# Patient Record
Sex: MALE | Race: WHITE | NOT HISPANIC OR LATINO
[De-identification: names, ages, dates, MRNs, and addresses within clinical notes are randomized per-mention and may not be internally consistent; named-entity substitution may affect disease eponyms.]

---
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
## 2022-02-15 ENCOUNTER — APPOINTMENT (OUTPATIENT)
Dept: ORTHOPEDIC SURGERY | Facility: CLINIC | Age: 22
End: 2022-02-15
Payer: MEDICAID

## 2022-02-15 PROCEDURE — 99214 OFFICE O/P EST MOD 30 MIN: CPT

## 2022-02-23 ENCOUNTER — OUTPATIENT (OUTPATIENT)
Dept: OUTPATIENT SERVICES | Facility: HOSPITAL | Age: 22
LOS: 1 days | End: 2022-02-23

## 2022-02-23 VITALS
RESPIRATION RATE: 16 BRPM | TEMPERATURE: 97 F | HEART RATE: 67 BPM | SYSTOLIC BLOOD PRESSURE: 100 MMHG | OXYGEN SATURATION: 99 % | HEIGHT: 71.5 IN | WEIGHT: 126.99 LBS | DIASTOLIC BLOOD PRESSURE: 70 MMHG

## 2022-02-23 DIAGNOSIS — M25.361 OTHER INSTABILITY, RIGHT KNEE: ICD-10-CM

## 2022-02-23 DIAGNOSIS — Z92.89 PERSONAL HISTORY OF OTHER MEDICAL TREATMENT: Chronic | ICD-10-CM

## 2022-02-23 LAB
HCT VFR BLD CALC: 43.7 % — SIGNIFICANT CHANGE UP (ref 39–50)
HGB BLD-MCNC: 15.3 G/DL — SIGNIFICANT CHANGE UP (ref 13–17)
MCHC RBC-ENTMCNC: 31 PG — SIGNIFICANT CHANGE UP (ref 27–34)
MCHC RBC-ENTMCNC: 35 GM/DL — SIGNIFICANT CHANGE UP (ref 32–36)
MCV RBC AUTO: 88.5 FL — SIGNIFICANT CHANGE UP (ref 80–100)
NRBC # BLD: 0 /100 WBCS — SIGNIFICANT CHANGE UP
NRBC # FLD: 0 K/UL — SIGNIFICANT CHANGE UP
PLATELET # BLD AUTO: 229 K/UL — SIGNIFICANT CHANGE UP (ref 150–400)
RBC # BLD: 4.94 M/UL — SIGNIFICANT CHANGE UP (ref 4.2–5.8)
RBC # FLD: 12.5 % — SIGNIFICANT CHANGE UP (ref 10.3–14.5)
WBC # BLD: 5 K/UL — SIGNIFICANT CHANGE UP (ref 3.8–10.5)
WBC # FLD AUTO: 5 K/UL — SIGNIFICANT CHANGE UP (ref 3.8–10.5)

## 2022-02-23 NOTE — H&P PST ADULT - HISTORY OF PRESENT ILLNESS
Patient instructed to contact surgeon's office concerning COVID test prior to surgery  Pt is a 21 yr old male scheduled for Right Knee Diagnostic Knee Arthroscopy Extra Articular Ligament Reconstruction Medial Patella Femoral Ligament with Dr Del Valle tentatively 3/2/22 - pt has prior injury to right knee x2 over past 3 yrs r/t instability and pain with use. Pt reinjured knee 12/21 and now for surgery - pt has full knee brace in place   Patient instructed to contact surgeon's office concerning COVID test prior to surgery

## 2022-02-23 NOTE — H&P PST ADULT - PROBLEM SELECTOR PLAN 1
Pt scheduled for surgery Right Knee Diagnostic Knee Arthroscopy Extra Articular Ligament Reconstruction Medial Patella Femoral Ligament with Dr Del Valle tentatively 3/2/22 and preop instructions including instructions for taking Famotidine and for Chlorhexidine use in showering on the day of surgery, given verbally and with use of  written materials, and patient confirming understanding of such instructions using  teach back method.

## 2022-02-23 NOTE — H&P PST ADULT - MUSCULOSKELETAL COMMENTS
Right knee pain without brace- pt c/o of instability of knee and pain with wt bearing - pt states right knee has mild swelling

## 2022-02-23 NOTE — H&P PST ADULT - NSANTHOSAYNRD_GEN_A_CORE
No. CORNELIA screening performed.  STOP BANG Legend: 0-2 = LOW Risk; 3-4 = INTERMEDIATE Risk; 5-8 = HIGH Risk

## 2022-02-25 NOTE — ADDENDUM
[FreeTextEntry1] : This note was written by Hannah Sim on 02/15/2022 acting solely as a scribe for Dr. Porfirio Del Valle.\par \par All medical record entries made by the Scribe were at my, Dr. Porfirio Del Valle, direction and personally dictated by me on 02/15/2022. I have personally reviewed the chart and agree that the record accurately reflects my personal performance of the history, physical exam, assessment and plan.

## 2022-02-25 NOTE — HISTORY OF PRESENT ILLNESS
[de-identified] : 21 year old male who presents today for follow up of right patellar instability. MRI knee dated 12/13/2021 shows evidence of recent patellar dislocation, TTTG measured 10 mm no significant chondral injury. He has been attending PT 2 x per week and reports improvement of ROM and current function. He presents in [place] brace at this time. He is here to discuss sx given multiple instability events. He is not takig pain medication.

## 2022-02-25 NOTE — DISCUSSION/SUMMARY
[de-identified] : 21-year-old male with right knee patellar instability\par \par Patient presents for follow-up of right knee patella instability.  Patient has been attending physical therapy with improvement of range of motion of function to the right knee.  He has some mild limitation in extension, but presents today for consideration of surgical stabilization of the patellofemoral joint.  \par \par Patient has malalignment including genu varum, patellar thomas, pes planus, with increase in TTTG seen on prior CT (~21mm).  No evidence of osteochondral injury on recent MRI, and MRI measures TTTG at 10mm.  Discussion was had with the patient and mother present regarding surgical stabilization of the right knee.  I believe that the patient would likely respond well to isolated medial patellofemoral ligament reconstruction despite mild elevation of TTTG on prior CT imaging.  Alternative would be for tibial tubercle osteotomy, but is my opinion that this is not required at this time.  We did discuss concern for future instability events even with medial patellofemoral ligament reconstruction.\par \par All risks, benefits and alternatives to the proposed surgical procedure, right knee arthroscopy and MPFL reconstruction, as well as the need for formal post-operative rehabilitation were discussed in great detail with the patient. Risks include but are not limited to pain, bleeding, infection, neurovascular injury, stiffness, patella instability, medical complications (including DVT, PE, MI), and risks of anesthesia. \par \par A discussion was also had with the patient regarding additional precautions during surgery given the recent Covid-19 pandemic; specifically with regards to perioperative care, visitor policy, and pre-admission testing. The patient understands that current protocol requires either documented Covid-19 vaccination or a negative Covid-19 test no more than 48hrs prior to surgery. \par \par Patient's and mother's questions and concerns were answered. They have elected to proceed and will schedule accordingly. For the time being, patient should continue PT for terminal extension.  \par \par

## 2022-02-25 NOTE — PHYSICAL EXAM
[de-identified] : Oriented to time, place, person\par Mood: Normal\par Affect: Normal\par Appearance: Healthy, well appearing, no acute distress.\par Gait: antalgic \par Assistive Devices: deepa brace\par \par Right knee exam\par \par Skin: Clean, dry, intact\par Inspection: No obvious malalignment, no masses, minimal effusion, negative J. sign, pes planus. Mild genu varum\par Pulses: 2+ DP/PT pulses\par ROM: 5-130 degrees of flexion. mild pain with knee flexion/extension.\par Tenderness: No MJLT. No LJLT. mild tenderness over the patella facets. No pain to the quadriceps tendon. No pain to the patella tendon. No posterior knee tenderness.\par Stability: Stable\par Strength: Intact Q/H/TA/GS/EHL, moderate quad atrophy\par Neuro: In tact to light touch throughout [de-identified] : Images were reviewed from ER dated 12.2.2021.\par \par 3 views of the right knee were obtained that show no acute fracture or dislocation. There is no medial, no lateral and no patellofemoral degenerative changes seen. There is no significant malalignment.  Possible small sliver avulsion fracture/osteochondral loose body.  Lipohemarthrosis.\par \par MRI knee dated 12/13/2021 shows evidence of recent patellar dislocation without evidence of osteochondral fragment. TTTG measured 10 mm. Large hemarthrosis. No significant chondral injury.

## 2022-03-01 ENCOUNTER — TRANSCRIPTION ENCOUNTER (OUTPATIENT)
Age: 22
End: 2022-03-01

## 2022-03-01 RX ORDER — OXYCODONE AND ACETAMINOPHEN 5; 325 MG/1; MG/1
5-325 TABLET ORAL
Qty: 30 | Refills: 0 | Status: ACTIVE | COMMUNITY
Start: 2022-03-01 | End: 1900-01-01

## 2022-03-02 ENCOUNTER — OUTPATIENT (OUTPATIENT)
Dept: OUTPATIENT SERVICES | Facility: HOSPITAL | Age: 22
LOS: 1 days | Discharge: HOME | End: 2022-03-02
Payer: MEDICAID

## 2022-03-02 ENCOUNTER — APPOINTMENT (OUTPATIENT)
Dept: ORTHOPEDIC SURGERY | Facility: AMBULATORY SURGERY CENTER | Age: 22
End: 2022-03-02

## 2022-03-02 VITALS
HEART RATE: 81 BPM | RESPIRATION RATE: 12 BRPM | SYSTOLIC BLOOD PRESSURE: 121 MMHG | DIASTOLIC BLOOD PRESSURE: 57 MMHG | OXYGEN SATURATION: 96 %

## 2022-03-02 VITALS
SYSTOLIC BLOOD PRESSURE: 121 MMHG | HEIGHT: 71.5 IN | WEIGHT: 126.99 LBS | OXYGEN SATURATION: 100 % | TEMPERATURE: 97 F | HEART RATE: 71 BPM | DIASTOLIC BLOOD PRESSURE: 68 MMHG | RESPIRATION RATE: 16 BRPM

## 2022-03-02 DIAGNOSIS — M25.361 OTHER INSTABILITY, RIGHT KNEE: ICD-10-CM

## 2022-03-02 DIAGNOSIS — Z92.89 PERSONAL HISTORY OF OTHER MEDICAL TREATMENT: Chronic | ICD-10-CM

## 2022-03-02 PROCEDURE — 27427 RECONSTRUCTION KNEE: CPT | Mod: AS,LT

## 2022-03-02 PROCEDURE — 27427 RECONSTRUCTION KNEE: CPT | Mod: LT

## 2022-03-02 DEVICE — IMP SYS MPLF FAST THREAD: Type: IMPLANTABLE DEVICE | Site: RIGHT | Status: FUNCTIONAL

## 2022-03-02 DEVICE — IMPLANTABLE DEVICE: Type: IMPLANTABLE DEVICE | Site: RIGHT | Status: FUNCTIONAL

## 2022-03-02 RX ORDER — HYDROXYZINE HCL 10 MG
2 TABLET ORAL
Qty: 0 | Refills: 0 | DISCHARGE

## 2022-03-02 RX ORDER — SODIUM CHLORIDE 9 MG/ML
1000 INJECTION, SOLUTION INTRAVENOUS
Refills: 0 | Status: DISCONTINUED | OUTPATIENT
Start: 2022-03-02 | End: 2022-03-16

## 2022-03-02 NOTE — ASU DISCHARGE PLAN (ADULT/PEDIATRIC) - CALL YOUR DOCTOR IF YOU HAVE ANY OF THE FOLLOWING:
Bleeding that does not stop/Fever greater than (need to indicate Fahrenheit or Celsius) Bleeding that does not stop/Swelling that gets worse/Pain not relieved by Medications/Fever greater than (need to indicate Fahrenheit or Celsius)/Wound/Surgical Site with redness, or foul smelling discharge or pus/Numbness, tingling, color or temperature change to extremity/Nausea and vomiting that does not stop/Inability to tolerate liquids or foods

## 2022-03-02 NOTE — ASU DISCHARGE PLAN (ADULT/PEDIATRIC) - NS MD DC FALL RISK RISK
For information on Fall & Injury Prevention, visit: [email or web address] OR  [email or web address] OR  https://www.cdc.gov/steadi/patient.html

## 2022-03-02 NOTE — ASU DISCHARGE PLAN (ADULT/PEDIATRIC) - PROCEDURE
Right knee arthroscopy extra articular ligament reconstruction medial patella femoral ligament reconstruction

## 2022-03-02 NOTE — ASU DISCHARGE PLAN (ADULT/PEDIATRIC) - ACTIVITY LEVEL
Weight bearing as tolerated weight bearing toe touch with crutches/No excercise/No heavy lifting/No sports/gym/Weight bearing as tolerated/Elevate extremity

## 2022-03-02 NOTE — ASU DISCHARGE PLAN (ADULT/PEDIATRIC) - CARE PROVIDER_API CALL
Porfirio Del Valle)  Orthopedics  [address]  [place] [address]  Phone: [phone]  Fax: [phone]  Follow Up Time:

## 2022-03-02 NOTE — ASU PREOPERATIVE ASSESSMENT, ADULT ([facility] ONLY) - FALL HARM RISK - RISK INTERVENTIONS

## 2022-03-02 NOTE — BRIEF OPERATIVE NOTE - NSICDXBRIEFPROCEDURE_GEN_ALL_CORE_FT
PROCEDURES:  Repair or release, ligament, medial patellofemoral, with patellar realignment 02-Mar-2022 14:50:43  Archie Caceres

## 2022-03-02 NOTE — ASU DISCHARGE PLAN (ADULT/PEDIATRIC) - PAIN MANAGEMENT
Prescriptions electronically submitted to pharmacy from [facility] oxycodone as needed for pain next dose 5pm, tylenol 1000mg every 6 hours can take next dose 645pm./Prescriptions electronically submitted to pharmacy from [facility]

## 2022-03-10 ENCOUNTER — APPOINTMENT (OUTPATIENT)
Dept: ORTHOPEDIC SURGERY | Facility: CLINIC | Age: 22
End: 2022-03-10
Payer: MEDICAID

## 2022-03-10 PROBLEM — J45.909 UNSPECIFIED ASTHMA, UNCOMPLICATED: Chronic | Status: ACTIVE | Noted: 2022-02-23

## 2022-03-10 PROCEDURE — 99024 POSTOP FOLLOW-UP VISIT: CPT

## 2022-03-11 NOTE — ADDENDUM
[FreeTextEntry1] : This note was written by Hannah Sim on 03/10/2022 acting solely as a scribe for Dr. Porfirio Del Valle.\par \par All medical record entries made by the Scribe were at my, Dr. Porfirio Del Valle, direction and personally dictated by me on 03/10/2022. I have personally reviewed the chart and agree that the record accurately reflects my personal performance of the history, physical exam, assessment and plan.

## 2022-03-11 NOTE — HISTORY OF PRESENT ILLNESS
[8] : the patient reports pain that is 8/10 in severity [Clean/Dry/Intact] : clean, dry and intact [Healed] : healed [Neuro Intact] : an unremarkable neurological exam [Vascular Intact] : ~T peripheral vascular exam normal [Doing Well] : is doing well [Excellent Pain Control] : has excellent pain control [No Sign of Infection] : is showing no signs of infection [Sutures Removed] : sutures were removed [Steri-Strips Removed & Replaced] : steri-strips removed and replaced [Chills] : no chills [Fever] : no fever [Nausea] : no nausea [Vomiting] : no vomiting [Erythema] : not erythematous [Discharge] : absent of discharge [Swelling] : not swollen [Dehiscence] : not dehisced [de-identified] : 22 y/o male s/p right knee MPFL reconstruction 3/2/22 [de-identified] : 20 y/o male s/p right knee MPFL reconstruction. He is doing well. Taking Ibuprofen for pain. Denies post op complications. Per mother, he is having difficulty getting around.  [de-identified] : Right Knee Exam:\par \par Skin: Incision(s) Clean, dry, intact, no drainage, healed\par Inspection: Residual swelling, moderate residual effusion, ecchymosis\par Pulses: 2+ DP/PT pulses \par ROM: Not tested [left/right]\par Tenderness: Tender throughout anterior knee joint.\par Stability: Stable\par Strength: Intact Q/H/TA/GS/EHL\par Neuro: Intact to light touch throughout  [de-identified] : 22 y/o male s/p right knee MPFL reconstruction. \par \par All intraoperative imaging was discussed in detail with the patient. All questions were answered. \par \par Recommendations: \par 1. PT evaluation and treatment; including AAROM/AROM, therapeutic exercise (include hamstring and quadriceps strengthening), heel slides, nonweightbearing stretch of the gastrocsoleus complex and straight leg raises to prevent extension lag. \par 2. Brace: Unlock brace for ambulation as tolerated. Remove the brace for sleeping as tolerated. May discontinue brace once full extension and without extensor lag has been achieved (approx 6wks post-op)\par 3. Meds: Continue [id number] daily, pain medication prn, may transition to NSAIDS.\par 4. Ice/elevate as needed and\par 5. Restrictions: None \par \par Followup in 4 weeks.

## 2022-04-05 ENCOUNTER — APPOINTMENT (OUTPATIENT)
Dept: ORTHOPEDIC SURGERY | Facility: CLINIC | Age: 22
End: 2022-04-05
Payer: MEDICAID

## 2022-04-05 PROCEDURE — 99024 POSTOP FOLLOW-UP VISIT: CPT

## 2022-04-14 NOTE — HISTORY OF PRESENT ILLNESS
[8] : the patient reports pain that is 8/10 in severity [Clean/Dry/Intact] : clean, dry and intact [Healed] : healed [Neuro Intact] : an unremarkable neurological exam [Vascular Intact] : ~T peripheral vascular exam normal [Excellent Pain Control] : has excellent pain control [No Sign of Infection] : is showing no signs of infection [Chills] : no chills [Fever] : no fever [Nausea] : no nausea [Vomiting] : no vomiting [Erythema] : not erythematous [Discharge] : absent of discharge [Swelling] : not swollen [Dehiscence] : not dehisced [Slow Progress] : is progressing slowly [de-identified] : 20 y/o male s/p right knee MPFL reconstruction 3/2/22 [de-identified] : 20 y/o male s/p right knee MPFL reconstruction. He is doing well. Attending PT 3 per week. He has reached 80 degrees in flexion. Taking Ibuprofen for pain. Presents in crutches and deepa brace. Denies post op complications.  [de-identified] : Right Knee Exam:\par \par Skin: Incision(s) Clean, dry, intact, no drainage, healed\par Inspection: Residual swelling, moderate residual effusion\par Pulses: 2+ DP/PT pulses \par ROM: 0-80 flexion. \par Tenderness: mild tender throughout anterior knee joint.\par Stability: Stable\par Strength: Intact Q/H/TA/GS/EHL, unable to straight leg raise\par Neuro: Intact to light touch throughout  [de-identified] : 22 y/o male s/p right knee MPFL reconstruction. \par \par Patient is doing extremely well at this time.  Patient is made significant progress with range of motion and function of the lower extremity.  He is much more comfortable on today's clinical examination and evaluation.  No significant concerns at this time.\par \par Recommendations: \par 1. Continue PT per protocol\par 2. Brace: May discontinue brace once full extension and without extensor lag has been achieved\par 3. Meds: Discontinue  mg daily, OTC pain/Nsaids medication prn\par 4. Continue symptomatic Ice/elevate as needed\par 5. Restrictions: as discussed. \par \par Followup in 6 weeks.

## 2022-04-14 NOTE — ADDENDUM
[FreeTextEntry1] : This note was written by Hannah Sim on 04/05/2022 acting solely as a scribe for Dr. Porfirio Del Valle.\par \par All medical record entries made by the Scribe were at my, Dr. Porfirio Del Valle, direction and personally dictated by me on 04/05/2022. I have personally reviewed the chart and agree that the record accurately reflects my personal performance of the history, physical exam, assessment and plan.

## 2022-05-17 ENCOUNTER — APPOINTMENT (OUTPATIENT)
Dept: ORTHOPEDIC SURGERY | Facility: CLINIC | Age: 22
End: 2022-05-17
Payer: MEDICAID

## 2022-05-17 DIAGNOSIS — S83.004D UNSPECIFIED DISLOCATION OF RIGHT PATELLA, SUBSEQUENT ENCOUNTER: ICD-10-CM

## 2022-05-17 DIAGNOSIS — M25.361 OTHER INSTABILITY, RIGHT KNEE: ICD-10-CM

## 2022-05-17 PROCEDURE — 99024 POSTOP FOLLOW-UP VISIT: CPT

## 2022-05-25 PROBLEM — S83.004D CLOSED DISLOCATION OF RIGHT PATELLA, SUBSEQUENT ENCOUNTER: Status: ACTIVE | Noted: 2018-04-05

## 2022-05-25 PROBLEM — M25.361 PATELLAR INSTABILITY OF RIGHT KNEE: Status: ACTIVE | Noted: 2019-06-25

## 2022-05-25 NOTE — HISTORY OF PRESENT ILLNESS
[0] : no pain reported [Clean/Dry/Intact] : clean, dry and intact [Healed] : healed [Neuro Intact] : an unremarkable neurological exam [Vascular Intact] : ~T peripheral vascular exam normal [Excellent Pain Control] : has excellent pain control [No Sign of Infection] : is showing no signs of infection [Chills] : no chills [Fever] : no fever [Nausea] : no nausea [Vomiting] : no vomiting [Erythema] : not erythematous [Discharge] : absent of discharge [Swelling] : not swollen [Dehiscence] : not dehisced [Doing Well] : is doing well [de-identified] : 20 y/o male s/p right knee MPFL reconstruction 3/2/22 [de-identified] : 20 y/o male s/p right knee MPFL reconstruction. He is doing well. Attending PT 3 per week. Taking Ibuprofen for pain as needed. Presents in  deepa brace. Denies post op complications.  [de-identified] : Right Knee Exam:\par \par Skin: Incision(s) Clean, dry, intact, no drainage, healed\par Inspection: mild residual swelling, no residual effusion\par Pulses: 2+ DP/PT pulses \par ROM: 0-135 flexion. \par Tenderness: tender medial reconstruction\par Stability: Stable \par Strength: Intact Q/H/TA/GS/EHL, able to straight leg raise, moderate quad atrophy. \par Neuro: Intact to light touch throughout  [de-identified] : 20 y/o male s/p right knee MPFL reconstruction. \par \par Patient is doing extremely well at this time.  Patient is made significant progress with range of motion and function of the lower extremity.  He is much more comfortable on today's clinical examination and evaluation.  No significant concerns at this time.\par \par Recommendations: \par \par 1. Continue PT per protocol; Progress to terminal ROM as tolerated. Continue hamstring/quad strengthening, advance closed chain exercises, proprioception exercise. Begin running/impact loading program. Progression of sport specific endurance/strengthening/sport specific drills. Goal of return to sport at 9-12mos following confirmation of strength, confidence and agility with completion of an RTP program.\par 2. Brace: OTC knee sleeve as needed\par 3. Meds: PRN use NSAIDs/Tylenol\par 4. Continue symptomatic Ice/elevate as needed\par 5. Restrictions: None \par \par Followup in 3 mos.

## 2022-05-25 NOTE — ADDENDUM
[FreeTextEntry1] : This note was written by Hannah Sim on 05/17/2022 acting solely as a scribe for Dr. Porfirio Del Valle.\par \par All medical record entries made by the Scribe were at my, Dr. Porfirio Del Valle, direction and personally dictated by me on 05/17/2022. I have personally reviewed the chart and agree that the record accurately reflects my personal performance of the history, physical exam, assessment and plan.

## 2022-07-05 ENCOUNTER — APPOINTMENT (OUTPATIENT)
Dept: ORTHOPEDIC SURGERY | Facility: CLINIC | Age: 22
End: 2022-07-05

## 2023-07-11 ENCOUNTER — APPOINTMENT (OUTPATIENT)
Dept: CT IMAGING | Facility: CLINIC | Age: 23
End: 2023-07-11

## 2023-07-11 ENCOUNTER — APPOINTMENT (OUTPATIENT)
Dept: RADIOLOGY | Facility: CLINIC | Age: 23
End: 2023-07-11

## 2024-06-18 NOTE — H&P PST ADULT - LAB RESULTS AND INTERPRETATION
Advance Care Planning     General Advance Care Planning (ACP) Conversation    Date of Conversation: 6/18/2024  Conducted with: Patient with Decision Making Capacity  Other persons present: None    Healthcare Decision Maker:   Primary Decision Maker: Laila Troncoso - [facility] - [phone]  Click here to complete Healthcare Decision Makers including selection of the Healthcare Decision Maker Relationship (ie \"Primary\").   Today we documented Decision Maker(s) consistent with Legal Next of Kin hierarchy.    Content/Action Overview:  Has NO ACP documents-Information provided  Reviewed DNR/DNI and patient elects Full Code (Attempt Resuscitation)  artificial nutrition, ventilation preferences, and resuscitation preferences      Length of Voluntary ACP Conversation in minutes:  <16 minutes (Non-Billable)    Genaro Castelan            
cbc
